# Patient Record
Sex: FEMALE | Race: WHITE | NOT HISPANIC OR LATINO
[De-identification: names, ages, dates, MRNs, and addresses within clinical notes are randomized per-mention and may not be internally consistent; named-entity substitution may affect disease eponyms.]

---

## 2023-03-27 ENCOUNTER — APPOINTMENT (OUTPATIENT)
Dept: ORTHOPEDIC SURGERY | Facility: CLINIC | Age: 38
End: 2023-03-27
Payer: MEDICAID

## 2023-03-27 ENCOUNTER — APPOINTMENT (OUTPATIENT)
Dept: ORTHOPEDIC SURGERY | Facility: CLINIC | Age: 38
End: 2023-03-27

## 2023-03-27 DIAGNOSIS — M79.641 PAIN IN RIGHT HAND: ICD-10-CM

## 2023-03-27 PROCEDURE — 99204 OFFICE O/P NEW MOD 45 MIN: CPT

## 2023-03-27 PROCEDURE — 73130 X-RAY EXAM OF HAND: CPT | Mod: RT

## 2023-03-27 NOTE — DISCUSSION/SUMMARY
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 \par \par She has limited function of her right hand and would like to discuss surgery.  With regard to surgery, at the index finger I would recommend removal of the plate and screws, extensor tenolysis and PIP joint dorsal capsular release and manipulation under anesthesia.  With regard to the right middle finger, I would recommend extensor tenolysis and PIP joint dorsal capsular release and manipulation under anesthesia.  I would not recommend addressing the index finger PIP joint volar plate instability at this time, as the postoperative protocol would be altered as she will require immediate motion after the above surgeries.  She understands that if this causes her problems in the future, then this could be addressed with a secondary surgical procedure.  She would like to proceed.  She does understand that she will require extensive hand therapy after the surgery.\par \par -  The nature and purposes of the operation/procedure was discussed in detail.  I discussed the surgical procedure in detail, as well as expected postoperative recovery and outcome.\par -  Possible risks, benefits, and complications (from known and unknown causes) of the procedure were discussed in detail.  \par -  Possible non-operative alternatives to the proposed treatment were discussed in detail.  \par -  She was told that possible risks/complications include, but are not limited to:  Infection, sensory nerve or vessel injury, stiffness, painful scar, poor outcome, need for additional surgical procedures, and other unforeseen complications.  \par -  In addition, the possibility of an "unsuccessful outcome," despite "successful surgery," was discussed with her.  She understands that more than likely she will not regain full motion, and even if she does, this may be unpredictable.  She also understands that she is at high risk for reformation of scar tissue and recurrent digital contracture.  Understanding this, she would like to proceed.  She will be scheduled sometime in the near future.\par -  The patient fully understands these risks and wishes to proceed.  \par -  I had a lengthy discussion with the patient regarding today's visit, the diagnosis, and my surgical treatment recommendations.  The patient has agreed to this plan of management and has expressed full understanding.  All questions were fully answered to the patient's satisfaction.\par \par The patient has agreed to this plan of management and has expressed full understanding.  All questions were fully answered to the patient's satisfaction.\par \par I spent greater than 1 hour on this case including review of past medical records and a prior operative report.  My cumulative time spent on this patient's visit included: Preparation for the visit, review of the medical records, review of pertinent diagnostic studies, examination and counseling of the patient on the above diagnosis, treatment plan and prognosis, orders of diagnostic tests, medications and/or appropriate procedures and documentation in the medical records of today's visit.

## 2023-03-27 NOTE — PHYSICAL EXAM
[de-identified] : - Constitutional: This is a female in no obvious distress.  \par - Psych: Patient is alert and oriented to person, place and time.  Patient has a normal mood and affect.\par - Cardiovascular: Normal pulses throughout the upper extremities.  No significant varicosities are noted in the upper extremities. \par - Neuro: Strength and sensation are intact throughout the upper extremities.  Patient has normal coordination.\par - Respiratory:  Patient exhibits no evidence of shortness of breath or difficulty breathing.\par - Skin: No rashes, lesions, or other abnormalities are noted in the upper extremities.\par \par ---\par \par Examination of her right middle finger demonstrates a healed incision along the dorsal ulnar aspect of the proximal phalanx.  She has active motion at the PIP joint from 0 to 10 degrees with no further passive motion.  She has loss of the terminal 30 degrees of DIP extension with active pull-through of the FDP tendon.  She is neurovascularly intact distally.\par \par Examination of her right index finger demonstrates hyperextension at the PIP joint with compensatory flexion at the DIP joint consistent with a swan-neck deformity.  She has laxity of the PIP joint volar plate.  At the PIP joint, she has motion from 0 to 20 degrees of flexion with a firm endpoint both actively and passively.  She is neurovascular intact distally. [de-identified] : AP, lateral and oblique radiographs of her right middle finger demonstrate a healed fracture at the base of the middle finger proximal phalanx with a dorsal ulnar plate.  There is slight ulnar deviation at the fracture but overall acceptable alignment.  There is no fracture involving the PIP joints of the middle or index finger or other fractures noted.

## 2023-03-27 NOTE — HISTORY OF PRESENT ILLNESS
[Left] : left hand dominant [FreeTextEntry1] : She comes in today for evaluation of contractures of her right hand.  She was attacked by a dog in April 2022.  She had emergency surgery on 4/26/2022.  This consisted of debridement of wounds, repair of a left wrist laceration and FCU repair as well as ORIF of a right middle finger proximal phalanx fracture.  She was treated with occupational therapy once a week since November 2022.  She was casted after the surgery.  She has had stiffness of her right index and middle fingers and comes in for further evaluation in this regard.

## 2023-04-10 ENCOUNTER — OUTPATIENT (OUTPATIENT)
Dept: OUTPATIENT SERVICES | Facility: HOSPITAL | Age: 38
LOS: 1 days | End: 2023-04-10
Payer: MEDICAID

## 2023-04-10 VITALS
HEART RATE: 80 BPM | TEMPERATURE: 99 F | SYSTOLIC BLOOD PRESSURE: 133 MMHG | RESPIRATION RATE: 12 BRPM | OXYGEN SATURATION: 100 % | HEIGHT: 62 IN | DIASTOLIC BLOOD PRESSURE: 84 MMHG | WEIGHT: 138.01 LBS

## 2023-04-10 DIAGNOSIS — Z98.890 OTHER SPECIFIED POSTPROCEDURAL STATES: Chronic | ICD-10-CM

## 2023-04-10 DIAGNOSIS — M24.541 CONTRACTURE, RIGHT HAND: ICD-10-CM

## 2023-04-10 DIAGNOSIS — Z01.818 ENCOUNTER FOR OTHER PREPROCEDURAL EXAMINATION: ICD-10-CM

## 2023-04-10 DIAGNOSIS — S62.612A DISPLACED FRACTURE OF PROXIMAL PHALANX OF RIGHT MIDDLE FINGER, INITIAL ENCOUNTER FOR CLOSED FRACTURE: ICD-10-CM

## 2023-04-10 LAB
HCT VFR BLD CALC: 38.3 % — SIGNIFICANT CHANGE UP (ref 34.5–45)
HGB BLD-MCNC: 12.5 G/DL — SIGNIFICANT CHANGE UP (ref 11.5–15.5)
MCHC RBC-ENTMCNC: 30.9 PG — SIGNIFICANT CHANGE UP (ref 27–34)
MCHC RBC-ENTMCNC: 32.6 GM/DL — SIGNIFICANT CHANGE UP (ref 32–36)
MCV RBC AUTO: 94.8 FL — SIGNIFICANT CHANGE UP (ref 80–100)
NRBC # BLD: 0 /100 WBCS — SIGNIFICANT CHANGE UP (ref 0–0)
PLATELET # BLD AUTO: 406 K/UL — HIGH (ref 150–400)
RBC # BLD: 4.04 M/UL — SIGNIFICANT CHANGE UP (ref 3.8–5.2)
RBC # FLD: 13 % — SIGNIFICANT CHANGE UP (ref 10.3–14.5)
WBC # BLD: 8.54 K/UL — SIGNIFICANT CHANGE UP (ref 3.8–10.5)
WBC # FLD AUTO: 8.54 K/UL — SIGNIFICANT CHANGE UP (ref 3.8–10.5)

## 2023-04-10 PROCEDURE — G0463: CPT

## 2023-04-10 PROCEDURE — 36415 COLL VENOUS BLD VENIPUNCTURE: CPT

## 2023-04-10 PROCEDURE — 85027 COMPLETE CBC AUTOMATED: CPT

## 2023-04-10 NOTE — H&P PST ADULT - ASSESSMENT
37 yo female is scheduled for right hand surgery 37 yo female is scheduled for right hand surgery on 4/25/2023

## 2023-04-10 NOTE — H&P PST ADULT - HISTORY OF PRESENT ILLNESS
39 yo female reports dog bite injury 1 year ago for which she was treated by ORIF plate and screws.  Her right fingers 2 and 3 are deformed and unable to bund and straighten,  Planned surgical procedure to be verified with office by OR Booking.

## 2023-04-10 NOTE — H&P PST ADULT - MUSCULOSKELETAL
details… no joint swelling/no joint erythema/no joint warmth/no calf tenderness/normal gait/no chest wall tenderness

## 2023-04-10 NOTE — H&P PST ADULT - NSICDXFAMILYHX_GEN_ALL_CORE_FT
FAMILY HISTORY:  Father  Still living? Yes, Estimated age: Age Unknown  Family history of stroke, Age at diagnosis: Age Unknown    Mother  Still living? Yes, Estimated age: Age Unknown  Family history of DVT, Age at diagnosis: Age Unknown  Family history of stomach cancer, Age at diagnosis: Age Unknown

## 2023-04-10 NOTE — H&P PST ADULT - NSANTHOSAYNRD_GEN_A_CORE
No. KERI screening performed.  STOP BANG Legend: 0-2 = LOW Risk; 3-4 = INTERMEDIATE Risk; 5-8 = HIGH Risk

## 2023-04-10 NOTE — H&P PST ADULT - PROBLEM SELECTOR PLAN 1
Right hand surgery involving fingers 2 and 3 is planned for 4/25/2023  OR booking was contacted to verify procedure  pre op instructions were reviewed  best wishes offered Right hand surgery involving fingers 2 and 3 is planned for 4/25/2023  OR booking was contacted to verify procedure  pre op instructions were reviewed  best wishes offered  Addendum: surgical booking was corrected to Right index finger proximal interphalangeal joint capsular release/right index finger extensor tenolysis/right index finger removal of hardware/ right index finger manipulation unde anesthesia/right middle finger proximal interphalangeal joint capsular release/right middle finger extensor tenolysis/right middle finger manipulation under anesthesia

## 2023-04-17 ENCOUNTER — NON-APPOINTMENT (OUTPATIENT)
Age: 38
End: 2023-04-17

## 2023-04-24 ENCOUNTER — TRANSCRIPTION ENCOUNTER (OUTPATIENT)
Age: 38
End: 2023-04-24

## 2023-04-25 ENCOUNTER — OUTPATIENT (OUTPATIENT)
Dept: OUTPATIENT SERVICES | Facility: HOSPITAL | Age: 38
LOS: 1 days | End: 2023-04-25
Payer: MEDICAID

## 2023-04-25 ENCOUNTER — APPOINTMENT (OUTPATIENT)
Dept: ORTHOPEDIC SURGERY | Facility: HOSPITAL | Age: 38
End: 2023-04-25

## 2023-04-25 ENCOUNTER — TRANSCRIPTION ENCOUNTER (OUTPATIENT)
Age: 38
End: 2023-04-25

## 2023-04-25 VITALS
HEIGHT: 64 IN | WEIGHT: 141.98 LBS | DIASTOLIC BLOOD PRESSURE: 84 MMHG | HEART RATE: 98 BPM | SYSTOLIC BLOOD PRESSURE: 132 MMHG | TEMPERATURE: 97 F | OXYGEN SATURATION: 100 % | RESPIRATION RATE: 18 BRPM

## 2023-04-25 VITALS
RESPIRATION RATE: 14 BRPM | HEART RATE: 85 BPM | SYSTOLIC BLOOD PRESSURE: 117 MMHG | DIASTOLIC BLOOD PRESSURE: 73 MMHG | OXYGEN SATURATION: 98 %

## 2023-04-25 DIAGNOSIS — S62.612A DISPLACED FRACTURE OF PROXIMAL PHALANX OF RIGHT MIDDLE FINGER, INITIAL ENCOUNTER FOR CLOSED FRACTURE: ICD-10-CM

## 2023-04-25 DIAGNOSIS — M24.541 CONTRACTURE, RIGHT HAND: ICD-10-CM

## 2023-04-25 DIAGNOSIS — Z98.890 OTHER SPECIFIED POSTPROCEDURAL STATES: Chronic | ICD-10-CM

## 2023-04-25 LAB — HCG UR QL: NEGATIVE — SIGNIFICANT CHANGE UP

## 2023-04-25 PROCEDURE — 26525 RELEASE FINGER CONTRACTURE: CPT | Mod: AS,RT

## 2023-04-25 PROCEDURE — 26525 RELEASE FINGER CONTRACTURE: CPT | Mod: F7

## 2023-04-25 PROCEDURE — 26445 RELEASE HAND/FINGER TENDON: CPT | Mod: RT,59

## 2023-04-25 PROCEDURE — 26440 RELEASE PALM/FINGER TENDON: CPT | Mod: AS,RT,59

## 2023-04-25 PROCEDURE — 26442 RELEASE PALM & FINGER TENDON: CPT | Mod: RT

## 2023-04-25 PROCEDURE — 26442 RELEASE PALM & FINGER TENDON: CPT | Mod: RT,59

## 2023-04-25 PROCEDURE — 26525 RELEASE FINGER CONTRACTURE: CPT | Mod: RT

## 2023-04-25 PROCEDURE — 26440 RELEASE PALM/FINGER TENDON: CPT | Mod: F7

## 2023-04-25 PROCEDURE — 26340 MANIPULATE FINGER W/ANESTH: CPT | Mod: RT,59,F6,F7

## 2023-04-25 PROCEDURE — 26445 RELEASE HAND/FINGER TENDON: CPT | Mod: F7

## 2023-04-25 PROCEDURE — 81025 URINE PREGNANCY TEST: CPT

## 2023-04-25 RX ORDER — ACETAMINOPHEN WITH CODEINE 300MG-30MG
1 TABLET ORAL
Qty: 10 | Refills: 0
Start: 2023-04-25

## 2023-04-25 RX ORDER — IBUPROFEN 200 MG
1 TABLET ORAL
Qty: 15 | Refills: 0
Start: 2023-04-25

## 2023-04-25 RX ORDER — SODIUM CHLORIDE 9 MG/ML
1000 INJECTION, SOLUTION INTRAVENOUS
Refills: 0 | Status: DISCONTINUED | OUTPATIENT
Start: 2023-04-25 | End: 2023-04-26

## 2023-04-25 RX ORDER — CEFAZOLIN SODIUM 1 G
2000 VIAL (EA) INJECTION ONCE
Refills: 0 | Status: COMPLETED | OUTPATIENT
Start: 2023-04-25 | End: 2023-04-25

## 2023-04-25 RX ORDER — HYDROMORPHONE HYDROCHLORIDE 2 MG/ML
0.5 INJECTION INTRAMUSCULAR; INTRAVENOUS; SUBCUTANEOUS
Refills: 0 | Status: DISCONTINUED | OUTPATIENT
Start: 2023-04-25 | End: 2023-04-26

## 2023-04-25 RX ORDER — OXYCODONE AND ACETAMINOPHEN 5; 325 MG/1; MG/1
1 TABLET ORAL ONCE
Refills: 0 | Status: DISCONTINUED | OUTPATIENT
Start: 2023-04-25 | End: 2023-04-26

## 2023-04-25 RX ORDER — HYDROMORPHONE HYDROCHLORIDE 2 MG/ML
0.25 INJECTION INTRAMUSCULAR; INTRAVENOUS; SUBCUTANEOUS
Refills: 0 | Status: DISCONTINUED | OUTPATIENT
Start: 2023-04-25 | End: 2023-04-26

## 2023-04-25 RX ORDER — APREPITANT 80 MG/1
40 CAPSULE ORAL ONCE
Refills: 0 | Status: COMPLETED | OUTPATIENT
Start: 2023-04-25 | End: 2023-04-25

## 2023-04-25 RX ORDER — ONDANSETRON 8 MG/1
4 TABLET, FILM COATED ORAL ONCE
Refills: 0 | Status: COMPLETED | OUTPATIENT
Start: 2023-04-25 | End: 2023-04-25

## 2023-04-25 RX ORDER — CHLORHEXIDINE GLUCONATE 213 G/1000ML
1 SOLUTION TOPICAL ONCE
Refills: 0 | Status: COMPLETED | OUTPATIENT
Start: 2023-04-25 | End: 2023-04-25

## 2023-04-25 RX ADMIN — CHLORHEXIDINE GLUCONATE 1 APPLICATION(S): 213 SOLUTION TOPICAL at 14:30

## 2023-04-25 RX ADMIN — APREPITANT 40 MILLIGRAM(S): 80 CAPSULE ORAL at 14:48

## 2023-04-25 RX ADMIN — ONDANSETRON 4 MILLIGRAM(S): 8 TABLET, FILM COATED ORAL at 18:10

## 2023-04-25 RX ADMIN — HYDROMORPHONE HYDROCHLORIDE 0.5 MILLIGRAM(S): 2 INJECTION INTRAMUSCULAR; INTRAVENOUS; SUBCUTANEOUS at 18:10

## 2023-04-25 RX ADMIN — HYDROMORPHONE HYDROCHLORIDE 0.5 MILLIGRAM(S): 2 INJECTION INTRAMUSCULAR; INTRAVENOUS; SUBCUTANEOUS at 18:30

## 2023-04-25 RX ADMIN — SODIUM CHLORIDE 75 MILLILITER(S): 9 INJECTION, SOLUTION INTRAVENOUS at 18:10

## 2023-04-25 NOTE — ASU DISCHARGE PLAN (ADULT/PEDIATRIC) - ASU DC SPECIAL INSTRUCTIONSFT
Weight Bearing Status: RUE/Right Hand Non-weight bearing.   Reduce activities as necessary. Use of assistive devices as necessary.  May ICE operative extremity to help with pain and swelling.  30 min on and 60 min off, several times a day.  Always use a barrier between skin and ice, such as a pillowcase or sheet to protect skin from thermal injury.    May elevate operative Extremity to help reduce pain and swelling.    You were prescribed a narcotic pain medication. Do not drive while taking or combine with other narcotics  Take with food and drink plenty of water/eat foods high in fiber to help with constipation. May take an over the counter laxative if necessary.    Keep Dressing Clean/Dry/Intact.  Keep splint intact.  Use of sealed cast bags or double wrapped plastic if showering    Follow Up in Office in ___. Please follow surgeons prior instructions regarding post surgical care.   Weight Bearing Status: RUE/Right Hand Weight bearing as tolerated with surgery in mind. ROM of fingers encouraged. Flex/Extend fingers using opposite hand to help get full range when necessary. Stretch Fingers to full extension and make full fist.   Reduce activities as necessary.   Sling for comfort as needed.     May ICE operative extremity to help with pain and swelling.  30 min on and 60 min off, several times a day.  Always use a barrier between skin and ice, such as a pillowcase or sheet to protect skin from thermal injury.    May elevate operative Extremity to help reduce pain and swelling.    You were prescribed a narcotic pain medication. Do not drive while taking or combine with other narcotics  Take with food and drink plenty of water/eat foods high in fiber to help with constipation. May take an over the counter laxative if necessary.    Keep Dressing Clean/Dry/Intact.  Keep dressing intact but may loosen if dressing is too tight or experiencing numbness. Try elevating hand first as swelling can cause temporary numbness as well.   Use of sealed cast bags or double wrapped plastic if showering. Do not get dressing wet.     Follow Up in Office on Friday 4/28/23. Call office to make appointment and confirm

## 2023-04-25 NOTE — BRIEF OPERATIVE NOTE - COMMENTS
Patient tolerated the procedure well and was transported to the PACU in stable condition. PA present for 100% of case.  Patient may be discharged home per PACU protocol.

## 2023-04-25 NOTE — ASU DISCHARGE PLAN (ADULT/PEDIATRIC) - ACTIVITY LEVEL
No weight bearing/Elevate extremity May use sling for comfort. ROM of right hand and fingers as tolerated./No excercise/No heavy lifting/Elevate extremity

## 2023-04-25 NOTE — BRIEF OPERATIVE NOTE - NSICDXBRIEFPROCEDURE_GEN_ALL_CORE_FT
PROCEDURES:  Tenolysis of flexor and extensor tendon of finger 25-Apr-2023 18:10:44 right index and right middle fingers Teja Almonte  Capsulectomy, with finger tenolysis 25-Apr-2023 18:22:45  Teja Almonte

## 2023-04-25 NOTE — ASU DISCHARGE PLAN (ADULT/PEDIATRIC) - PROVIDER TOKENS
PROVIDER:[TOKEN:[[id number]],ESTABLISHEDPATIENT:[T]] PROVIDER:[TOKEN:[[id number]],SCHEDULEDAPPT:[04/28/2023],ESTABLISHEDPATIENT:[T]]

## 2023-04-25 NOTE — BRIEF OPERATIVE NOTE - NSICDXBRIEFPOSTOP_GEN_ALL_CORE_FT
POST-OP DIAGNOSIS:  Displaced fracture of proximal phalanx of right middle finger 25-Apr-2023 18:15:49  Teja Almonte  Contracture, right hand 25-Apr-2023 18:15:43 right index and middle fingers Teja Almonte

## 2023-04-25 NOTE — ASU DISCHARGE PLAN (ADULT/PEDIATRIC) - SHOWER ONLY DURATION DAY(S)
keep splint clean and dry. If showering, cover with cast bags or seal with double wrapped plastic. Do not remove splint.

## 2023-04-25 NOTE — ASU DISCHARGE PLAN (ADULT/PEDIATRIC) - PROCEDURE
Right Index Finger PIP capsular release, extensor tenolysis, removal of hardware, and Manipulation under anesthesia, Right Middle Finger PIP capsular release, extensor tendon tenolysis, and manipulation under anesthesia Right Index Finger PIP capsular release, extensor and flexor tendon tenolysis, and manipulation under anesthesia, Right Middle Finger PIP capsular release, flexor and extensor tendon tenolysis, and manipulation under anesthesia Right Index Finger PIP capsular release, flexor tendon tenolysis, extensor tendon tenolysis, and manipulation under anesthesia, Right Middle Finger PIP capsular release, flexor tendon tenolysis, extensor tendon tenolysis, and manipulation under anesthesia

## 2023-04-25 NOTE — BRIEF OPERATIVE NOTE - NSICDXBRIEFPREOP_GEN_ALL_CORE_FT
PRE-OP DIAGNOSIS:  Contracture, right hand 25-Apr-2023 18:14:13 right index and middle fingers Teja Alomnte  Displaced fracture of proximal phalanx of right middle finger 25-Apr-2023 18:15:33  Teja Almonte

## 2023-04-25 NOTE — ASU DISCHARGE PLAN (ADULT/PEDIATRIC) - CARE PROVIDER_API CALL
Jaime Whitman)  Orthopaedic Surgery; Surgery of the Hand  [address], [address]  [place] [address]  Phone: [phone]  Fax: [phone]  Established Patient  Follow Up Time:    Jaime Whitman)  Orthopaedic Surgery; Surgery of the Hand  [address], [address]  [place] [address]  Phone: [phone]  Fax: [phone]  Established Patient  Scheduled Appointment: 04/28/2023

## 2023-04-26 PROBLEM — M24.541 CONTRACTURE, RIGHT HAND: Chronic | Status: ACTIVE | Noted: 2023-04-10

## 2023-04-28 ENCOUNTER — NON-APPOINTMENT (OUTPATIENT)
Age: 38
End: 2023-04-28

## 2023-04-28 ENCOUNTER — APPOINTMENT (OUTPATIENT)
Dept: ORTHOPEDIC SURGERY | Facility: CLINIC | Age: 38
End: 2023-04-28
Payer: MEDICAID

## 2023-04-28 PROCEDURE — 99024 POSTOP FOLLOW-UP VISIT: CPT

## 2023-04-28 NOTE — HISTORY OF PRESENT ILLNESS
[de-identified] : 3 days postoperative. [de-identified] : 3 days status post right index and middle finger extensor tenolysis, flexor tenolysis PIP joint capsular release and manipulation under anesthesia.  Date of surgery: 4/25/2023\par \par She is having postoperative pain and soreness, which are worse in the morning. She denies numbness. She will be starting hand therapy today. She is taking Oxycodone and Ibuprofen for pain. She rates her pain as an 8 out of 10 at this time.\par \par She is accompanied by her father today. [de-identified] : Examination of her right index and middle fingers after her dressings were removed demonstrates her incisions to be clean and dry.  There is minimal bleeding but no evidence of infection.  There is some swelling.  She has improved flexion and extension of the index and middle fingers.  She is neurovascular intact distally. [de-identified] : Stable, 3 days postoperative.   [de-identified] : Dressings were applied.  She was instructed on local wound care and flexion and extension exercises.  She was referred to hand therapy, which she will begin later on today.  She will follow-up in 10 days for suture removal.

## 2023-04-28 NOTE — ADDENDUM
[FreeTextEntry1] : I, Ute Amezcua, acted solely as a scribe for Dr. Whitman on this date on 04/28/2023.

## 2023-04-28 NOTE — END OF VISIT
[FreeTextEntry3] : This note was written by Ute Amezcua on 04/28/2023 acting solely as a scribe for Dr. Jaime Whitman.\par  \par All medical record entries made by the Scribe were at my, Dr. Jaime Whitman, direction and personally dictated by me on 04/28/2023. I have personally reviewed the chart and agree that the record accurately reflects my personal performance of the history, physical exam, assessment and plan.

## 2023-05-08 ENCOUNTER — APPOINTMENT (OUTPATIENT)
Dept: ORTHOPEDIC SURGERY | Facility: CLINIC | Age: 38
End: 2023-05-08
Payer: MEDICAID

## 2023-05-08 PROCEDURE — 99024 POSTOP FOLLOW-UP VISIT: CPT

## 2023-05-19 ENCOUNTER — NON-APPOINTMENT (OUTPATIENT)
Age: 38
End: 2023-05-19

## 2023-05-31 ENCOUNTER — APPOINTMENT (OUTPATIENT)
Dept: ORTHOPEDIC SURGERY | Facility: CLINIC | Age: 38
End: 2023-05-31
Payer: MEDICAID

## 2023-05-31 PROCEDURE — 99024 POSTOP FOLLOW-UP VISIT: CPT

## 2023-05-31 NOTE — END OF VISIT
[FreeTextEntry3] : This note was written by Ute Amezcua on 05/31/2023 acting solely as a scribe for Dr. Jaime Whitman.\par  \par All medical record entries made by the Scribe were at my, Dr. Jaime Whitman, direction and personally dictated by me on 05/31/2023. I have personally reviewed the chart and agree that the record accurately reflects my personal performance of the history, physical exam, assessment and plan.

## 2023-05-31 NOTE — ADDENDUM
[FreeTextEntry1] : I, Ute Amezcua, acted solely as a scribe for Dr. Whitman on this date on 05/31/2023.

## 2023-05-31 NOTE — HISTORY OF PRESENT ILLNESS
[de-identified] : 36 days postoperative. [de-identified] : 36 days status post right index and middle finger extensor tenolysis, flexor tenolysis PIP joint capsular release and manipulation under anesthesia.  Date of surgery: 4/25/2023\par \par She is in occupational therapy.\par \par She is in hand therapy twice weekly with mild relief. She does however state she has noticed the fingers stiffening up a bit. She states her therapist is requesting a prescription for a splint as well as a new referral.  [de-identified] : Examination of her right index and middle fingers demonstrates her incisions to be well-healed.  There is decreased swelling.  She has improved flexion and extension of the index and middle fingers compared to preoperative, but appears to have some loss of terminal flexion as compared to when she was last seen in the office 23 days ago.  She remains neurovascularly intact distally. [de-identified] : Stable, 36 days postoperative.   [de-identified] : At this rocio, she will continue with a home exercise program and hand therapy and was provided with an updated referral. At her therapists request, she was also provided with a referral to be fitted with static progressive IP joint splints. Finally, as she does have some loss of motion at the PIP joints since the procedure in April, dependent upon her progress, I would likely recommend an in office manipulation under local anesthesia. She will follow up in 19 days to assess her progress.

## 2023-06-10 ENCOUNTER — NON-APPOINTMENT (OUTPATIENT)
Age: 38
End: 2023-06-10

## 2023-06-12 PROBLEM — M20.031 SWAN-NECK DEFORMITY OF FINGER OF RIGHT HAND: Status: ACTIVE | Noted: 2023-03-27

## 2023-06-12 PROBLEM — S62.612A FRACTURE OF PROXIMAL PHALANX OF RIGHT MIDDLE FINGER: Status: ACTIVE | Noted: 2023-03-27

## 2023-06-19 ENCOUNTER — APPOINTMENT (OUTPATIENT)
Dept: ORTHOPEDIC SURGERY | Facility: CLINIC | Age: 38
End: 2023-06-19
Payer: MEDICAID

## 2023-06-19 DIAGNOSIS — S62.612A DISPLACED FRACTURE OF PROXIMAL PHALANX OF RIGHT MIDDLE FINGER, INITIAL ENCOUNTER FOR CLOSED FRACTURE: ICD-10-CM

## 2023-06-19 DIAGNOSIS — M20.031 SWAN-NECK DEFORMITY OF RIGHT FINGER(S): ICD-10-CM

## 2023-06-19 PROCEDURE — 26340 MANIPULATE FINGER W/ANESTH: CPT | Mod: RT,F7,79

## 2023-06-19 PROCEDURE — 20550 NJX 1 TENDON SHEATH/LIGAMENT: CPT | Mod: RT,F6,79

## 2023-06-19 PROCEDURE — 99024 POSTOP FOLLOW-UP VISIT: CPT

## 2023-06-19 RX ORDER — LIDOCAINE HYDROCHLORIDE 10 MG/ML
1 INJECTION, SOLUTION INFILTRATION; PERINEURAL
Qty: 0 | Refills: 0 | Status: COMPLETED | OUTPATIENT
Start: 2023-06-19

## 2023-06-19 RX ORDER — BETAMETHA AC,SOD PHOS/WATER/PF 6 MG/ML
6 (3-3) VIAL (ML) INJECTION
Qty: 1 | Refills: 0 | Status: COMPLETED | OUTPATIENT
Start: 2023-06-19

## 2023-06-19 RX ADMIN — BETAMETHASONE ACETATE AND BETAMETHASONE SODIUM PHOSPHATE 2 MG/ML: 3; 3 INJECTION, SUSPENSION INTRA-ARTICULAR; INTRALESIONAL; INTRAMUSCULAR; SOFT TISSUE at 00:00

## 2023-06-19 RX ADMIN — LIDOCAINE HYDROCHLORIDE 4 %: 10 INJECTION, SOLUTION INFILTRATION; PERINEURAL at 00:00

## 2023-06-19 NOTE — HISTORY OF PRESENT ILLNESS
[de-identified] : 55 days postoperative. [de-identified] : 55 days status post right index and middle finger extensor tenolysis, flexor tenolysis PIP joint capsular release and manipulation under anesthesia.  Date of surgery: 4/25/2023\par \par She is in occupational therapy.\par \par She states that the splints were not approved.  However, she is still having stiffness. [de-identified] : Examination of her right index and middle fingers demonstrates her incisions to be well-healed.  There is decreased swelling.  She has improved flexion and extension of the index and middle fingers compared to preoperative, but appears to have some loss of terminal flexion as compared to when she was last seen in the office 23 days ago.  She remains neurovascularly intact distally. [de-identified] : Stable, 55 days postoperative, with residual stiffness and limitation of terminal flexion..   [de-identified] : At this time, she agreed to proceed with an attempt at manipulation under anesthesia in the office.  I recommended injecting both of the areas of the flexor tendon sheaths with a combination of lidocaine and Celestone.  She was instructed on continued hand therapy and a home exercise program.  She will follow-up in 4 weeks.

## 2023-06-19 NOTE — PROCEDURE
[de-identified] : Using sterile technique, the region of the right index and middle finger A1 pulley was injected with a combination of approximately 2 cc of 1% lidocaine and 1/2 cc of Celestone.  After the fingers were anesthetized, the fingers were manipulated in flexion and extension.  After the manipulation, the fingers could be passively flexed into the palm.  There was some improvement in active flexion.\par \par She tolerated the procedure well without complications.  She was told that the hand will likely become sore.  She was instructed on range of motion exercises and continued hand therapy.  She will follow-up in approximate 4 weeks.

## 2023-06-30 ENCOUNTER — NON-APPOINTMENT (OUTPATIENT)
Age: 38
End: 2023-06-30

## 2023-07-10 ENCOUNTER — APPOINTMENT (OUTPATIENT)
Dept: ORTHOPEDIC SURGERY | Facility: CLINIC | Age: 38
End: 2023-07-10
Payer: MEDICAID

## 2023-07-10 PROCEDURE — 99024 POSTOP FOLLOW-UP VISIT: CPT

## 2023-07-10 NOTE — HISTORY OF PRESENT ILLNESS
[de-identified] : 76 days postoperative. [de-identified] : 76 days status post right index and middle finger extensor tenolysis, flexor tenolysis PIP joint capsular release and manipulation under anesthesia.  Date of surgery: 4/25/2023\par \par She is in occupational therapy.\par \par When she was seen in the office 3 weeks ago, she underwent manipulation under anesthesia.\par \par She is somewhat improved but continues to have stiffness as would be expected. [de-identified] : Examination of her right index and middle fingers demonstrates her incisions to be well-healed.  There is decreased swelling.  She has improved flexion and extension of the index and middle fingers compared to preoperative, but still cannot make a full composite fist.  She does have active pull-through of the flexor tendons.  She remains neurovascularly intact distally. [de-identified] : Stable, 76 days postoperative, with clinical improvement but continued stiffness as would be expected.. [de-identified] : She was instructed on continued hand therapy, as long as it is authorized, in addition to a home exercise program.  She will follow-up in 6 weeks.\par \par As discussed with her preoperatively, I do not expect that she will regain full function of her hand.  However, she is significantly improved compared to before her surgery.

## 2023-08-19 ENCOUNTER — NON-APPOINTMENT (OUTPATIENT)
Age: 38
End: 2023-08-19

## 2023-08-21 PROBLEM — M24.541 CONTRACTURE OF RIGHT HAND: Status: ACTIVE | Noted: 2023-03-27

## 2023-08-28 ENCOUNTER — APPOINTMENT (OUTPATIENT)
Dept: ORTHOPEDIC SURGERY | Facility: CLINIC | Age: 38
End: 2023-08-28
Payer: MEDICAID

## 2023-08-28 DIAGNOSIS — M24.541 CONTRACTURE, RIGHT HAND: ICD-10-CM

## 2023-08-28 PROCEDURE — 99024 POSTOP FOLLOW-UP VISIT: CPT

## 2023-08-28 NOTE — DISCUSSION/SUMMARY
[FreeTextEntry1] : I had a discussion regarding today's visit, the diagnosis and treatment recommendations and options.  We also discussed changes since the last visit.  At this time, I recommended a continued home exercise program.  She will follow-up with me on an as-needed basis.  The patient has agreed to the above plan of management and has expressed full understanding.  All questions were fully answered to the patient's satisfaction.  My cumulative time spent on today's visit was greater than 30 minutes and included: Preparation for the visit, review of the medical records, review of pertinent diagnostic studies, examination and counseling of the patient on the above diagnosis, treatment plan and prognosis, orders of diagnostic tests, medications and/or appropriate procedures and documentation in the medical records of today's visit.

## 2023-08-28 NOTE — ADDENDUM
[FreeTextEntry1] : I, Neisha Kincaid, acted solely as a scribe for Dr. Whitman on this date on 08/28/2023.

## 2023-08-28 NOTE — PHYSICAL EXAM
[de-identified] : - Constitutional: This is a female in no obvious distress.   - Psych: Patient is alert and oriented to person, place and time.  Patient has a normal mood and affect. - Cardiovascular: Normal pulses throughout the upper extremities.  No significant varicosities are noted in the upper extremities.  - Neuro: Strength and sensation are intact throughout the upper extremities.  Patient has normal coordination. - Respiratory:  Patient exhibits no evidence of shortness of breath or difficulty breathing. - Skin: No rashes, lesions, or other abnormalities are noted in the upper extremities.  ---  Examination of her right index and middle fingers demonstrates her incisions to be well-healed.  There is decreased swelling.  She has improved flexion and extension of the index and middle fingers compared to preoperative and she can touch the palm with the index and middle fingers.  She still cannot make a full composite fist.  She does have active pull-through of the flexor tendons.  She remains neurovascularly intact distally.

## 2023-08-28 NOTE — END OF VISIT
[FreeTextEntry3] : This note was written by Neisha Kincaid on 08/28/2023 acting solely as a scribe for Dr. Jaime Whitman.   All medical record entries made by the Scribe were at my, Dr. Jaime Whitman, direction and personally dictated by me on 08/28/2023. I have personally reviewed the chart and agree that the record accurately reflects my personal performance of the history, physical exam, assessment and plan.

## 2023-08-28 NOTE — HISTORY OF PRESENT ILLNESS
[FreeTextEntry1] : Follow-up regarding right index and middle finger extensor tenolysis, flexor tenolysis, and PIP joint capsular releases on 4/25/2023.  She returns today, with improved flexion but residual and constant aching pain, at 6/10. She also reports intermittent sharp pains. She denies numbness and tingling.

## 2024-04-24 NOTE — ASU DISCHARGE PLAN (ADULT/PEDIATRIC) - NS MD DC FALL RISK RISK
For information on Fall & Injury Prevention, visit: [email or web address] OR  [email or web address] OR  https://www.cdc.gov/steadi/patient.html no

## 2024-08-31 NOTE — HISTORY OF PRESENT ILLNESS
[de-identified] : 13 days postoperative. [de-identified] : 13 days status post right index and middle finger extensor tenolysis, flexor tenolysis PIP joint capsular release and manipulation under anesthesia.  Date of surgery: 4/25/2023\par \par She is in occupational therapy.\par \par She is having some pain, particular at the middle finger.  She also has some loss of extension at the middle finger. [de-identified] : Examination of her right index and middle fingers demonstrates her incision to be healing well.  There is decreased swelling.  She has improved flexion and extension of the index and middle fingers.  She does have some loss of extension at the DIP joint of the middle finger and a swan-neck deformity, which is not surprising, as she has laxity of the volar plate.  This was previously discussed with her.  She is neurovascular intact distally. [de-identified] : Stable, 13 days postoperative.   [de-identified] : The sutures were removed and Steri-Strips were applied.  She was instructed on continue range of motion exercises, occupational therapy, and when to begin scar massage and desensitization.  She will follow-up in 3.   Bleeding gums

## (undated) DEVICE — SUT ETHIBOND 3-0 30" V-5

## (undated) DEVICE — NDL HYPO REGULAR BEVEL 25G X 1.5" (BLUE)

## (undated) DEVICE — BUR STRYKER MICRO ROUND POLISHING 2X54MM 18 FLUTES

## (undated) DEVICE — TOURNIQUET ESMARK 4"

## (undated) DEVICE — SUT SOFSILK 2-0 18" C-15

## (undated) DEVICE — SUT POLYSORB 4-0 30" C-13 UNDYED

## (undated) DEVICE — POSITIONER STRAP ARMBOARD VELCRO TS-30

## (undated) DEVICE — DRSG WEBRIL 3"

## (undated) DEVICE — DRAPE 3/4 SHEET 52X76"

## (undated) DEVICE — ELCTR BIPOLAR CORD 12FT

## (undated) DEVICE — SUT BIOSYN 5-0 18" P-13

## (undated) DEVICE — GLV 7.5 PROTEXIS (WHITE)

## (undated) DEVICE — GLV 7.5 PROTEXIS (BLUE)

## (undated) DEVICE — SUT MONOSOF 4-0 18" C-13

## (undated) DEVICE — DRSG STERISTRIPS 0.5 X 4"

## (undated) DEVICE — SUT POLYSORB 2-0 18" V-20

## (undated) DEVICE — TOURNIQUET CUFF 18" DUAL PORT SINGLE BLADDER W PLC  (BLACK)

## (undated) DEVICE — SUT MONOSOF 3-0 30" C-16

## (undated) DEVICE — VENODYNE/SCD SLEEVE CALF MEDIUM

## (undated) DEVICE — BUR MICROAIRE CARBIDE ROUND 4MM

## (undated) DEVICE — SUT POLYSORB 3-0 18" V-20 UNDYED

## (undated) DEVICE — SUT MONOCRYL 4-0 18" P-3 UNDYED

## (undated) DEVICE — WARMING BLANKET LOWER ADULT

## (undated) DEVICE — PACK UPPER EXTREMITY

## (undated) DEVICE — DRAPE TOWEL BLUE 17" X 24"

## (undated) DEVICE — SUT VICRYL 3-0 27" RB-1 UNDYED

## (undated) DEVICE — Device

## (undated) DEVICE — DRSG ACE BANDAGE 4"

## (undated) DEVICE — SOLIDIFIER CANN EXPRESS 3K

## (undated) DEVICE — SUT BIOSYN 4-0 18" P-13

## (undated) DEVICE — SUT NYLON 5-0 18" R-5

## (undated) DEVICE — DRSG CURITY GAUZE SPONGE 4 X 4" 12-PLY

## (undated) DEVICE — DRSG STOCKINETTE TUBULAR COTTON 2PLY 2X2.5"

## (undated) DEVICE — BLADE SCALPEL SAFETYLOCK #15

## (undated) DEVICE — POSITIONER FOAM HEAD CRADLE (PINK)

## (undated) DEVICE — DRAPE C ARM MINI

## (undated) DEVICE — DRAPE SURGICAL #1010

## (undated) DEVICE — CANISTER SUCTION LID GUARD 3000CC

## (undated) DEVICE — GLV 7 PROTEXIS (WHITE)

## (undated) DEVICE — GLV 8 PROTEXIS (BLUE)